# Patient Record
Sex: FEMALE | Race: WHITE | NOT HISPANIC OR LATINO
[De-identification: names, ages, dates, MRNs, and addresses within clinical notes are randomized per-mention and may not be internally consistent; named-entity substitution may affect disease eponyms.]

---

## 2017-02-28 ENCOUNTER — APPOINTMENT (OUTPATIENT)
Dept: HEART AND VASCULAR | Facility: CLINIC | Age: 72
End: 2017-02-28

## 2017-02-28 VITALS
BODY MASS INDEX: 28.34 KG/M2 | HEART RATE: 77 BPM | SYSTOLIC BLOOD PRESSURE: 134 MMHG | HEIGHT: 62 IN | WEIGHT: 154 LBS | DIASTOLIC BLOOD PRESSURE: 74 MMHG

## 2017-09-08 ENCOUNTER — APPOINTMENT (OUTPATIENT)
Dept: HEART AND VASCULAR | Facility: CLINIC | Age: 72
End: 2017-09-08
Payer: COMMERCIAL

## 2017-09-08 VITALS
BODY MASS INDEX: 28.34 KG/M2 | HEART RATE: 78 BPM | SYSTOLIC BLOOD PRESSURE: 128 MMHG | DIASTOLIC BLOOD PRESSURE: 67 MMHG | WEIGHT: 154 LBS | HEIGHT: 62 IN

## 2017-09-08 PROCEDURE — 99214 OFFICE O/P EST MOD 30 MIN: CPT | Mod: 25

## 2017-09-08 PROCEDURE — 93288 INTERROG EVL PM/LDLS PM IP: CPT

## 2018-03-27 ENCOUNTER — APPOINTMENT (OUTPATIENT)
Dept: HEART AND VASCULAR | Facility: CLINIC | Age: 73
End: 2018-03-27
Payer: COMMERCIAL

## 2018-03-27 VITALS
DIASTOLIC BLOOD PRESSURE: 63 MMHG | HEIGHT: 62 IN | SYSTOLIC BLOOD PRESSURE: 132 MMHG | BODY MASS INDEX: 28.71 KG/M2 | HEART RATE: 81 BPM | WEIGHT: 156 LBS

## 2018-03-27 PROCEDURE — 93280 PM DEVICE PROGR EVAL DUAL: CPT

## 2018-03-27 RX ORDER — RALOXIFENE HYDROCHLORIDE 60 MG/1
60 TABLET, FILM COATED ORAL DAILY
Refills: 0 | Status: DISCONTINUED | COMMUNITY
Start: 2017-09-08 | End: 2018-03-27

## 2018-09-28 ENCOUNTER — APPOINTMENT (OUTPATIENT)
Dept: HEART AND VASCULAR | Facility: CLINIC | Age: 73
End: 2018-09-28
Payer: COMMERCIAL

## 2018-09-28 VITALS
DIASTOLIC BLOOD PRESSURE: 72 MMHG | SYSTOLIC BLOOD PRESSURE: 142 MMHG | HEART RATE: 79 BPM | BODY MASS INDEX: 28.71 KG/M2 | WEIGHT: 156 LBS | HEIGHT: 62 IN

## 2018-09-28 PROCEDURE — 93280 PM DEVICE PROGR EVAL DUAL: CPT

## 2018-11-27 ENCOUNTER — APPOINTMENT (OUTPATIENT)
Dept: HEART AND VASCULAR | Facility: CLINIC | Age: 73
End: 2018-11-27
Payer: COMMERCIAL

## 2018-11-27 VITALS
SYSTOLIC BLOOD PRESSURE: 133 MMHG | HEART RATE: 84 BPM | HEIGHT: 62 IN | DIASTOLIC BLOOD PRESSURE: 66 MMHG | BODY MASS INDEX: 28.52 KG/M2 | WEIGHT: 155 LBS

## 2018-11-27 PROCEDURE — 93280 PM DEVICE PROGR EVAL DUAL: CPT

## 2018-11-27 NOTE — PROCEDURE
[No] : not [Complete Heart Block] : complete heart block [Pacemaker] : pacemaker [DDD] : DDD [Voltage: ___ volts] : Voltage was [unfilled] volts [Threshold Testing Performed] : Threshold testing was performed [Lead Imp:  ___ohms] : lead impedance was [unfilled] ohms [Sensing Amplitude ___mv] : sensing amplitude was [unfilled] mv [___V @] : [unfilled] V [___ ms] : [unfilled] ms [None] : none [Asense-Vpace ___ %] : Asense-Vpace [unfilled]% [Apace-Vpace ___ %] : Apace-Vpace [unfilled]% [de-identified] : escape ~ 50 bpm  [de-identified] : Medtronic [de-identified] : Revo MRI [de-identified] : [id number] [de-identified] : 11/1/12 [de-identified] :  [de-identified] : AP 5%\par  >99%    \par No A or V events

## 2018-11-27 NOTE — PHYSICAL EXAM
[General Appearance - Well Developed] : well developed [Normal Appearance] : normal appearance [Well Groomed] : well groomed [General Appearance - Well Nourished] : well nourished [No Deformities] : no deformities [General Appearance - In No Acute Distress] : no acute distress [Heart Rate And Rhythm] : heart rate and rhythm were normal [Heart Sounds] : normal S1 and S2 [Murmurs] : no murmurs present [Arterial Pulses Normal] : the arterial pulses were normal [Edema] : no peripheral edema present [Respiration, Rhythm And Depth] : normal respiratory rhythm and effort [Exaggerated Use Of Accessory Muscles For Inspiration] : no accessory muscle use [Auscultation Breath Sounds / Voice Sounds] : lungs were clear to auscultation bilaterally [Right Infraclavicular] : right infraclavicular area [Clean] : clean [Dry] : dry [Well-Healed] : well-healed [Palpable Crepitus] : no palpable crepitus [Bleeding] : no active bleeding [Foul Odor] : no foul smell [Purulent Drainage] : no purulent drainage [Serosanguineous Drainage] : no serosanquineous drainage [Serous Drainage] : no serous drainage [Erythema] : not erythematous [Warm] : not warm [Tender] : not tender [Indurated] : not indurated [Fluctuant] : not fluctuant [Abdomen Soft] : soft [Abdomen Tenderness] : non-tender [Abdomen Mass (___ Cm)] : no abdominal mass palpated [Nail Clubbing] : no clubbing of the fingernails [Cyanosis, Localized] : no localized cyanosis [Petechial Hemorrhages (___cm)] : no petechial hemorrhages [] : no ischemic changes

## 2018-11-27 NOTE — REVIEW OF SYSTEMS
[Fever] : no fever [Headache] : no headache [Recent Weight Gain (___ Lbs)] : no recent weight gain [Chills] : no chills [Feeling Fatigued] : not feeling fatigued [Recent Weight Loss (___ Lbs)] : no recent weight loss [Shortness Of Breath] : no shortness of breath [Dyspnea on exertion] : not dyspnea during exertion [Chest  Pressure] : no chest pressure [Chest Pain] : no chest pain [Lower Ext Edema] : no extremity edema [Leg Claudication] : no intermittent leg claudication [Palpitations] : no palpitations [Cough] : no cough [Wheezing] : no wheezing [Coughing Up Blood] : no hemoptysis [Dizziness] : no dizziness [Negative] : Heme/Lymph

## 2018-11-27 NOTE — HISTORY OF PRESENT ILLNESS
[Palpitations] : no palpitations [SOB] : no dyspnea [Syncope] : no syncope [Dizziness] : no dizziness [Chest Pain] : no chest pain or discomfort [ICD Shocks] : no recent ICD shocks [Shoulder Pain] : no shoulder pain [Pain at Site] : no pain at device site [Erythema at Site] : no erythema at device site [Swelling at Site] : no swelling at device site [de-identified] : Ms. Woodall is a 73 year old female with a pmhx of breast CA who is S/P PPM implant for complete AV block.  She continues to feel well and is without complaints.  No SOB, chest discomfort, palpitations, dizziness, syncope, PPM site complaints.  \par \par She presents today requesting clearance for upcoming cystoscopy or possible resection of bladder cancer.

## 2019-06-04 ENCOUNTER — APPOINTMENT (OUTPATIENT)
Dept: HEART AND VASCULAR | Facility: CLINIC | Age: 74
End: 2019-06-04
Payer: COMMERCIAL

## 2019-06-04 VITALS — HEART RATE: 78 BPM | DIASTOLIC BLOOD PRESSURE: 78 MMHG | SYSTOLIC BLOOD PRESSURE: 140 MMHG

## 2019-06-04 PROCEDURE — 93280 PM DEVICE PROGR EVAL DUAL: CPT

## 2019-06-04 NOTE — HISTORY OF PRESENT ILLNESS
[Palpitations] : no palpitations [SOB] : no dyspnea [Syncope] : no syncope [Dizziness] : no dizziness [Chest Pain] : no chest pain or discomfort [ICD Shocks] : no recent ICD shocks [Shoulder Pain] : no shoulder pain [Pain at Site] : no pain at device site [Erythema at Site] : no erythema at device site [Swelling at Site] : no swelling at device site [de-identified] : Ms. Woodall is a 73 year old female with a pmhx of breast CA who is S/P PPM implant for complete AV block.  She continues to feel well and is without complaints.  No SOB, chest discomfort, palpitations, dizziness, syncope, PPM site complaints.  \par \par Interval history significant for diagnosis of bladder CA s/p resection.  S/P 6 rounds of BCG.  Developed severe lower extremity pain- now on Cymbalta and undergoing PT.

## 2019-06-04 NOTE — PROCEDURE
[No] : not [Pacemaker] : pacemaker [Complete Heart Block] : complete heart block [DDD] : DDD [Voltage: ___ volts] : Voltage was [unfilled] volts [Threshold Testing Performed] : Threshold testing was performed [Lead Imp:  ___ohms] : lead impedance was [unfilled] ohms [Sensing Amplitude ___mv] : sensing amplitude was [unfilled] mv [___V @] : [unfilled] V [___ ms] : [unfilled] ms [None] : none [Asense-Vpace ___ %] : Asense-Vpace [unfilled]% [Apace-Vpace ___ %] : Apace-Vpace [unfilled]% [de-identified] : escape ~ 50 bpm  [de-identified] : Medtronic [de-identified] : Revo MRI [de-identified] : [id number] [de-identified] : 11/1/12 [de-identified] :  [de-identified] : AP 4%\par  >99%    \par 1 Brief episode NSVT 11/27/19

## 2019-06-04 NOTE — REVIEW OF SYSTEMS
[Negative] : Heme/Lymph [Fever] : no fever [Headache] : no headache [Recent Weight Gain (___ Lbs)] : no recent weight gain [Chills] : no chills [Feeling Fatigued] : not feeling fatigued [Recent Weight Loss (___ Lbs)] : no recent weight loss [Shortness Of Breath] : no shortness of breath [Dyspnea on exertion] : not dyspnea during exertion [Chest  Pressure] : no chest pressure [Chest Pain] : no chest pain [Lower Ext Edema] : no extremity edema [Leg Claudication] : no intermittent leg claudication [Palpitations] : no palpitations [Cough] : no cough [Wheezing] : no wheezing [Coughing Up Blood] : no hemoptysis [Dizziness] : no dizziness

## 2019-06-04 NOTE — PHYSICAL EXAM
[General Appearance - Well Developed] : well developed [Well Groomed] : well groomed [Normal Appearance] : normal appearance [General Appearance - Well Nourished] : well nourished [General Appearance - In No Acute Distress] : no acute distress [Heart Rate And Rhythm] : heart rate and rhythm were normal [No Deformities] : no deformities [Heart Sounds] : normal S1 and S2 [Murmurs] : no murmurs present [Arterial Pulses Normal] : the arterial pulses were normal [Edema] : no peripheral edema present [Exaggerated Use Of Accessory Muscles For Inspiration] : no accessory muscle use [Respiration, Rhythm And Depth] : normal respiratory rhythm and effort [Auscultation Breath Sounds / Voice Sounds] : lungs were clear to auscultation bilaterally [Right Infraclavicular] : right infraclavicular area [Well-Healed] : well-healed [Dry] : dry [Clean] : clean [Abdomen Soft] : soft [Abdomen Tenderness] : non-tender [Abdomen Mass (___ Cm)] : no abdominal mass palpated [Cyanosis, Localized] : no localized cyanosis [Petechial Hemorrhages (___cm)] : no petechial hemorrhages [Nail Clubbing] : no clubbing of the fingernails [] : no ischemic changes [Palpable Crepitus] : no palpable crepitus [Bleeding] : no active bleeding [Foul Odor] : no foul smell [Purulent Drainage] : no purulent drainage [Serosanguineous Drainage] : no serosanquineous drainage [Serous Drainage] : no serous drainage [Erythema] : not erythematous [Warm] : not warm [Tender] : not tender [Indurated] : not indurated [Fluctuant] : not fluctuant

## 2019-08-23 ENCOUNTER — APPOINTMENT (OUTPATIENT)
Dept: HEART AND VASCULAR | Facility: CLINIC | Age: 74
End: 2019-08-23
Payer: COMMERCIAL

## 2019-08-23 VITALS
DIASTOLIC BLOOD PRESSURE: 67 MMHG | WEIGHT: 160 LBS | SYSTOLIC BLOOD PRESSURE: 128 MMHG | HEART RATE: 80 BPM | HEIGHT: 62 IN | BODY MASS INDEX: 29.44 KG/M2

## 2019-08-23 PROCEDURE — 99213 OFFICE O/P EST LOW 20 MIN: CPT | Mod: 25

## 2019-08-23 PROCEDURE — 93280 PM DEVICE PROGR EVAL DUAL: CPT

## 2019-08-23 NOTE — HISTORY OF PRESENT ILLNESS
[Palpitations] : no palpitations [SOB] : no dyspnea [Syncope] : no syncope [Dizziness] : no dizziness [Chest Pain] : no chest pain or discomfort [ICD Shocks] : no recent ICD shocks [Shoulder Pain] : no shoulder pain [Pain at Site] : no pain at device site [Erythema at Site] : no erythema at device site [Swelling at Site] : no swelling at device site [de-identified] : Ms. Woodall is a 73 year old female with a PMHx of breast CA who is S/P PPM implant for complete /high degree AV block.  She continues to feel well and is without complaints.  No SOB, chest discomfort, palpitations, dizziness, syncope, PPM site complaints.  \par \par Interval history significant for diagnosis of bladder CA s/p resection.  S/P 6 rounds of BCG. She reports that the bladder cancer has returned and she will need another surgery.\par \par She has no pacemaker related complaints.

## 2019-08-23 NOTE — PHYSICAL EXAM
[General Appearance - Well Developed] : well developed [Normal Appearance] : normal appearance [Well Groomed] : well groomed [General Appearance - Well Nourished] : well nourished [No Deformities] : no deformities [General Appearance - In No Acute Distress] : no acute distress [Heart Rate And Rhythm] : heart rate and rhythm were normal [Heart Sounds] : normal S1 and S2 [Murmurs] : no murmurs present [Arterial Pulses Normal] : the arterial pulses were normal [Edema] : no peripheral edema present [Respiration, Rhythm And Depth] : normal respiratory rhythm and effort [Exaggerated Use Of Accessory Muscles For Inspiration] : no accessory muscle use [Auscultation Breath Sounds / Voice Sounds] : lungs were clear to auscultation bilaterally [Right Infraclavicular] : right infraclavicular area [Clean] : clean [Dry] : dry [Well-Healed] : well-healed [Abdomen Soft] : soft [Abdomen Tenderness] : non-tender [Abdomen Mass (___ Cm)] : no abdominal mass palpated [Cyanosis, Localized] : no localized cyanosis [Nail Clubbing] : no clubbing of the fingernails [Petechial Hemorrhages (___cm)] : no petechial hemorrhages [] : no ischemic changes [Palpable Crepitus] : no palpable crepitus [Bleeding] : no active bleeding [Foul Odor] : no foul smell [Purulent Drainage] : no purulent drainage [Serosanguineous Drainage] : no serosanquineous drainage [Serous Drainage] : no serous drainage [Erythema] : not erythematous [Warm] : not warm [Fluctuant] : not fluctuant [Tender] : not tender [Indurated] : not indurated

## 2019-08-23 NOTE — REVIEW OF SYSTEMS
[Negative] : Heme/Lymph [Headache] : no headache [Fever] : no fever [Recent Weight Gain (___ Lbs)] : no recent weight gain [Feeling Fatigued] : not feeling fatigued [Chills] : no chills [Recent Weight Loss (___ Lbs)] : no recent weight loss [Shortness Of Breath] : no shortness of breath [Dyspnea on exertion] : not dyspnea during exertion [Chest  Pressure] : no chest pressure [Chest Pain] : no chest pain [Lower Ext Edema] : no extremity edema [Leg Claudication] : no intermittent leg claudication [Palpitations] : no palpitations [Cough] : no cough [Wheezing] : no wheezing [Dizziness] : no dizziness [Coughing Up Blood] : no hemoptysis

## 2020-08-14 ENCOUNTER — APPOINTMENT (OUTPATIENT)
Dept: HEART AND VASCULAR | Facility: CLINIC | Age: 75
End: 2020-08-14
Payer: COMMERCIAL

## 2020-08-14 VITALS
OXYGEN SATURATION: 99 % | RESPIRATION RATE: 14 BRPM | WEIGHT: 160 LBS | SYSTOLIC BLOOD PRESSURE: 120 MMHG | HEART RATE: 80 BPM | HEIGHT: 62 IN | BODY MASS INDEX: 29.44 KG/M2 | TEMPERATURE: 98.3 F | DIASTOLIC BLOOD PRESSURE: 82 MMHG

## 2020-08-14 PROCEDURE — 93280 PM DEVICE PROGR EVAL DUAL: CPT

## 2020-08-14 PROCEDURE — 99212 OFFICE O/P EST SF 10 MIN: CPT | Mod: 25

## 2020-08-14 RX ORDER — DULOXETINE HYDROCHLORIDE 30 MG/1
CAPSULE, DELAYED RELEASE ORAL
Refills: 0 | Status: DISCONTINUED | COMMUNITY
End: 2020-08-14

## 2020-08-14 NOTE — HISTORY OF PRESENT ILLNESS
"[facility] Treatment      Name: Yanna Kwong  Clinic Number: [id number]  Date of Treatment: 2018   Diagnosis:   No diagnosis found.  Physician: Shirin Krishna, *    Pain pattern determined: 1 PEN  Plan of care signed: 2017   Time in: 7:40 (Pt 10 mins late)  Time Out:8:40  Total Treatment time: 65 min  Precautions: Standard  Visit #: 18    POC due: 18 not signed yet, but sent 18  Reassessment done: 18  Next reassessment due: 18      Pt has completed previous PT with [facility] and is now charged as commercial insurance.     Face to Face discussion of patient was done between PT and PTA.       Subjective   Yanna reports feeling ok today with no pain, although she still gets LBP at the end of the day. The pain was centralized to the low back and R buttock.    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, light duty, sitting, occasionally going up and down stairs)    Leisure: Bowl, walking for exercise, Does "[employer]"                      Pts goals:  Reduce pain, learn how to manage your symptom     Objective     Baseline IM Testing Results:   Date of testin2017  ROM 48-0 deg   Max Peak Torque 82    Min Peak Torque 34    Flex/Ext Ratio 2.4   % below normative data -69      Date of testin2017  ROM 60-0 deg   Max Peak Torque 125   Min Peak Torque 56   Flex/Ext Ratio 2.3   % below normative data --46      OUTCOMES:   From Initial Evaluation  FOTO: Focus on Therapeutic Outcomes   Category: lumbar   % Impaired: FOTO Not completed at time of eval  Current Score  = CJ = at least 20% but < 40% impaired, limited or restricted  Goal at Discharge Score = CJ = at least 20% but < 40% impaired, limited or restricted    Treatment    Pt was instructed in and performed the following:     Yanna received therapeutic exercises to " develop/improved posture, cardiovascular endurance, muscular endurance, lumbar ROM, strength and muscular endurance for 50 minutes including the following exercises:         [facility] 2/16/2018   Visit Number 18   VAS Pain Rating 2   Treadmill Time (in min.) -   Speed -   Extension in Lying -   Extension in Standing 10   Flexion in Lying 10   Manual Therapy -   Lumbar Flexion -   Lumbar Extension -   Lumbar Peak Torque -   Min Torque -   Lumbar Weight 69   Repetitions 18   Rating of Perceived Exertion 3   Ice - Z Lie (in min.) 10       Sidelying clamshells 15x cuing to watch pelvic and engage core  Flexion in lying 10x,   Supine pelvic tilts 10   Modified Piriformis stretch 10 cuing for positioning hip  Bridges 10x  Sidelying bridge 10x   Calf raises off step or on wedge 15-20 reps, 3-4x weekly      Peripheral muscle strengthening which included 1 set of 15-20 repetitions at a slow, controlled 7 second per rep pace focused on strengthening supporting musculature for improved body mechanics and functional mobility.  Pt and therapist focused on proper form during treatment to ensure optimal strengthening of each targeted muscle group.  Machines were utilized including torso rotation, leg extension, leg curl, chest press, upright row. Tricep extension, bicep curl, ( omitted today leg press, and hip abduction.)    Manual therapy: None      Home Exercise Program as follows:   Sidelying clamshells 15x, 3-4x weekly   Flexion in lying 10x, 3x/day  Supine pelvic tilts 5-10 s/h 5x, 2x daily   Z-lie 10-20 min, 2x daily  Modified Piriformis stretch 10s/h 3x/day  Bridges 10x, 3x/day  Standing extension 10x, 3x/day  Figure four stretches  Sidelying bridge 10x   Calf raises off step or on wedge 15-20 reps, 3-4x weekly     Tennis ball trigger point release on wall and supine hold for 15 secs.   Handouts were given to the patient. Pt demo good understanding of the education provided. Yanna demonstrated good return  demonstration of activities.     Lumbar roll use compliance: no  Additional exercises taught this treatment session:  HEP Review   Assessment     Patient reported mild low back pain which improved throughout treatment session. Pt performs HEP with mild-moderate v/c for technique. Educated her to stand and do EIS when having pain with sitting. Pt was able to complete 18 reps  on lumbar med  With no c/o LBP. Pt performed all resistance exercises without increased symptoms.   Pt is making fair progress with treatment.    Pt will continue to benefit from skilled outpatient physical therapy to address the deficits stated in the impairment chart, provide pt/family education and to maximize pt's level of independence in the home and community environment.       Pt's spiritual, cultural and educational needs considered and pt agreeable to plan of care and goals as stated below:     Medical necessity is demonstrated by the following IMPAIRMENTS/PROBLEMS:    Pt presents with the following impairments:   History  Co-morbidities and personal factors that may impact the plan of care   Examination  Body Structures and Functions, activity limitations and participation restrictions that may impact the plan of care Clinical Presentation Decision Making/ Complexity Score   Co-morbidities:   Standard           Personal Factors:   lifestyle  character   Body Regions:   back  lower extremities     Body Systems:   gross symmetry  ROM  strength  gross coordinated movement  transitions     Activity limitations:   Learning and applying knowledge  no deficits     General Tasks and Commands  no deficits     Communication  no deficits     Mobility  lifting and carrying objects  walking  driving (bike, car, motorcycle)  Standing     Self care  washing oneself (bathing, drying, washing hands)  caring for body parts (brushing teeth, shaving, grooming)  dressing     Domestic Life  doing house work (cleaning house, washing dishes,  laundry)     Interactions/Relationships  no deficits     Life Areas  employment     Community and Social Life  community life  recreation and leisure     Participation Restrictions:   Increased pain with prolonged sitting needed for work. Also limited ability to perform exercise.     stable and uncomplicated    Low      Goals:   Short term goals:  6 weeks or 10 visits   1.  Pt will demonstrate increased lumbar ROM by at least 3 degrees from the initial ROM value with improvements noted in functional ROM and ability to perform ADLs.MET  2.  Pt will demonstrate increased maximum isometric torque value by 10% when compared to the initial value resulting in improved ability to perform bending, lifting, and carrying activities safely, confidently.  (MET)  3.  Patient report a reduction in worst pain score by 1-2 points for improved tolerance during work and recreational activities.  (progressing, not met)  4.  Pt able to perform HEP correctly with minimal cueing or supervision for therapist.  (MET)     Long term goals: 13 weeks or 20 visits   1. Pt will demonstrate increased lumbar ROM by at least 6 degrees from initial ROM value, resulting in improved ability to perform functional fwd bending while standing and sitting.  (progressing, not met)  2. Pt will demonstrate increased maximum isometric torque value by 20% when compared to the initial value resulting in improved ability to perform bending, lifting, and carrying activities safely, confidently.  (progressing, not met)  3. Pt to demonstrate ability to independently control and reduce their pain through posture positioning and mechanical movements throughout a typical day.  (progressing, not met)  4.  Patient will demonstrate improved overall function per FOTO Survey to CJ = at least 20% but < 40% impaired, limited or restricted score or less.  (progressing, not met)         Plan   Continue with POC   [SOB] : no dyspnea [Syncope] : no syncope [Palpitations] : no palpitations [Chest Pain] : no chest pain or discomfort [ICD Shocks] : no recent ICD shocks [Dizziness] : no dizziness [Shoulder Pain] : no shoulder pain [Erythema at Site] : no erythema at device site [Pain at Site] : no pain at device site [Swelling at Site] : no swelling at device site [de-identified] : Ms. Woodall is a 74 year old female with a PMHx of breast CA who is S/P PPM implant for complete /high degree AV block who presents for routine f/u and EP clearance for hip surgery. She continues to feel well and is without complaints.  No SOB, chest discomfort, palpitations, dizziness, syncope, PPM site complaints.  \par \par Interval history significant for diagnosis of bladder CA s/p resection.  S/P 6 rounds of BCG. She reports that the bladder cancer has returned and she will need another surgery.\par \par She has no pacemaker related complaints.

## 2020-08-14 NOTE — PHYSICAL EXAM
[Well Groomed] : well groomed [General Appearance - Well Developed] : well developed [Normal Appearance] : normal appearance [General Appearance - Well Nourished] : well nourished [General Appearance - In No Acute Distress] : no acute distress [No Deformities] : no deformities [Heart Rate And Rhythm] : heart rate and rhythm were normal [Heart Sounds] : normal S1 and S2 [Arterial Pulses Normal] : the arterial pulses were normal [Murmurs] : no murmurs present [Edema] : no peripheral edema present [Exaggerated Use Of Accessory Muscles For Inspiration] : no accessory muscle use [Auscultation Breath Sounds / Voice Sounds] : lungs were clear to auscultation bilaterally [Respiration, Rhythm And Depth] : normal respiratory rhythm and effort [Right Infraclavicular] : right infraclavicular area [Clean] : clean [Dry] : dry [Well-Healed] : well-healed [Abdomen Soft] : soft [Abdomen Tenderness] : non-tender [Nail Clubbing] : no clubbing of the fingernails [Abdomen Mass (___ Cm)] : no abdominal mass palpated [] : no ischemic changes [Cyanosis, Localized] : no localized cyanosis [Petechial Hemorrhages (___cm)] : no petechial hemorrhages [Foul Odor] : no foul smell [Palpable Crepitus] : no palpable crepitus [Bleeding] : no active bleeding [Serous Drainage] : no serous drainage [Serosanguineous Drainage] : no serosanquineous drainage [Purulent Drainage] : no purulent drainage [Erythema] : not erythematous [Warm] : not warm [Tender] : not tender [Indurated] : not indurated [Fluctuant] : not fluctuant

## 2020-08-14 NOTE — PROCEDURE
[No] : not [Complete Heart Block] : complete heart block [Voltage: ___ volts] : Voltage was [unfilled] volts [Pacemaker] : pacemaker [DDD] : DDD [Threshold Testing Performed] : Threshold testing was performed [Lead Imp:  ___ohms] : lead impedance was [unfilled] ohms [Sensing Amplitude ___mv] : sensing amplitude was [unfilled] mv [___V @] : [unfilled] V [___ ms] : [unfilled] ms [None] : none [Asense-Vpace ___ %] : Asense-Vpace [unfilled]% [Apace-Vpace ___ %] : Apace-Vpace [unfilled]% [de-identified] : escape ~ 50 bpm  [de-identified] : Medtronic [de-identified] : Revo MRI [de-identified] : [id number] [de-identified] : 11/1/12 [de-identified] :  [de-identified] : AP 5%\par  >99%    \par No AT/AF\par No NSVT/VT

## 2020-08-14 NOTE — REVIEW OF SYSTEMS
[Negative] : Heme/Lymph [Recent Weight Gain (___ Lbs)] : no recent weight gain [Fever] : no fever [Headache] : no headache [Chills] : no chills [Feeling Fatigued] : not feeling fatigued [Recent Weight Loss (___ Lbs)] : no recent weight loss [Dyspnea on exertion] : not dyspnea during exertion [Shortness Of Breath] : no shortness of breath [Chest Pain] : no chest pain [Lower Ext Edema] : no extremity edema [Chest  Pressure] : no chest pressure [Leg Claudication] : no intermittent leg claudication [Palpitations] : no palpitations [Cough] : no cough [Wheezing] : no wheezing [Coughing Up Blood] : no hemoptysis [Dizziness] : no dizziness

## 2020-08-14 NOTE — DISCUSSION/SUMMARY
[Patient] : the patient [Pacemaker Function Normal] : normal pacemaker function [FreeTextEntry1] : Patient presents for routine device check and EPS clearance for R hip sugery. The device is functioning appropriately. She is NOT dependent. There are no contraindications to the planned surgery. Please call [phone] with any questions.

## 2021-03-09 ENCOUNTER — APPOINTMENT (OUTPATIENT)
Dept: HEART AND VASCULAR | Facility: CLINIC | Age: 76
End: 2021-03-09

## 2021-07-09 ENCOUNTER — PREPPED CHART (OUTPATIENT)
Dept: URBAN - METROPOLITAN AREA CLINIC 92 | Facility: CLINIC | Age: 76
End: 2021-07-09

## 2021-07-09 PROBLEM — H43.813 POSTERIOR VITREOUS DETACHMENT: Noted: 2021-07-09

## 2021-07-09 PROBLEM — H25.13 NS CATARACT: Noted: 2021-07-09

## 2021-07-09 PROBLEM — H25.11 NS CATARACT: Noted: 2021-07-09

## 2021-08-30 ENCOUNTER — APPOINTMENT (OUTPATIENT)
Dept: HEART AND VASCULAR | Facility: CLINIC | Age: 76
End: 2021-08-30
Payer: COMMERCIAL

## 2021-08-30 ENCOUNTER — NON-APPOINTMENT (OUTPATIENT)
Age: 76
End: 2021-08-30

## 2021-08-30 PROCEDURE — 93294 REM INTERROG EVL PM/LDLS PM: CPT

## 2021-08-30 PROCEDURE — 93296 REM INTERROG EVL PM/IDS: CPT

## 2021-09-17 ENCOUNTER — APPOINTMENT (OUTPATIENT)
Dept: HEART AND VASCULAR | Facility: CLINIC | Age: 76
End: 2021-09-17

## 2021-09-21 ENCOUNTER — FORM ENCOUNTER (OUTPATIENT)
Age: 76
End: 2021-09-21

## 2021-11-30 ENCOUNTER — NON-APPOINTMENT (OUTPATIENT)
Age: 76
End: 2021-11-30

## 2021-11-30 ENCOUNTER — APPOINTMENT (OUTPATIENT)
Dept: HEART AND VASCULAR | Facility: CLINIC | Age: 76
End: 2021-11-30
Payer: COMMERCIAL

## 2021-11-30 PROCEDURE — 93296 REM INTERROG EVL PM/IDS: CPT

## 2021-11-30 PROCEDURE — 93294 REM INTERROG EVL PM/LDLS PM: CPT | Mod: NC

## 2021-12-08 ENCOUNTER — FORM ENCOUNTER (OUTPATIENT)
Age: 76
End: 2021-12-08

## 2022-03-01 ENCOUNTER — NON-APPOINTMENT (OUTPATIENT)
Age: 77
End: 2022-03-01

## 2022-03-01 ENCOUNTER — APPOINTMENT (OUTPATIENT)
Dept: HEART AND VASCULAR | Facility: CLINIC | Age: 77
End: 2022-03-01
Payer: COMMERCIAL

## 2022-03-01 PROCEDURE — 93296 REM INTERROG EVL PM/IDS: CPT

## 2022-03-01 PROCEDURE — 93294 REM INTERROG EVL PM/LDLS PM: CPT

## 2022-03-02 ENCOUNTER — FORM ENCOUNTER (OUTPATIENT)
Age: 77
End: 2022-03-02

## 2022-05-31 ENCOUNTER — APPOINTMENT (OUTPATIENT)
Dept: HEART AND VASCULAR | Facility: CLINIC | Age: 77
End: 2022-05-31

## 2022-06-02 ENCOUNTER — FORM ENCOUNTER (OUTPATIENT)
Age: 77
End: 2022-06-02

## 2022-06-03 ENCOUNTER — APPOINTMENT (OUTPATIENT)
Dept: HEART AND VASCULAR | Facility: CLINIC | Age: 77
End: 2022-06-03
Payer: COMMERCIAL

## 2022-06-03 VITALS
SYSTOLIC BLOOD PRESSURE: 180 MMHG | BODY MASS INDEX: 29.08 KG/M2 | HEIGHT: 62 IN | WEIGHT: 158 LBS | DIASTOLIC BLOOD PRESSURE: 110 MMHG | HEART RATE: 93 BPM | TEMPERATURE: 95.8 F | OXYGEN SATURATION: 98 %

## 2022-06-03 PROCEDURE — 93280 PM DEVICE PROGR EVAL DUAL: CPT

## 2022-06-03 NOTE — PHYSICAL EXAM
[General Appearance - Well Developed] : well developed [Normal Appearance] : normal appearance [Well Groomed] : well groomed [General Appearance - Well Nourished] : well nourished [No Deformities] : no deformities [General Appearance - In No Acute Distress] : no acute distress [Heart Rate And Rhythm] : heart rate and rhythm were normal [Heart Sounds] : normal S1 and S2 [Murmurs] : no murmurs present [Arterial Pulses Normal] : the arterial pulses were normal [Edema] : no peripheral edema present [Respiration, Rhythm And Depth] : normal respiratory rhythm and effort [Exaggerated Use Of Accessory Muscles For Inspiration] : no accessory muscle use [Auscultation Breath Sounds / Voice Sounds] : lungs were clear to auscultation bilaterally [Right Infraclavicular] : right infraclavicular area [Clean] : clean [Dry] : dry [Well-Healed] : well-healed [Abdomen Soft] : soft [Abdomen Tenderness] : non-tender [Abdomen Mass (___ Cm)] : no abdominal mass palpated [Nail Clubbing] : no clubbing of the fingernails [Cyanosis, Localized] : no localized cyanosis [Petechial Hemorrhages (___cm)] : no petechial hemorrhages [] : no ischemic changes [Palpable Crepitus] : no palpable crepitus [Bleeding] : no active bleeding [Foul Odor] : no foul smell [Purulent Drainage] : no purulent drainage [Serosanguineous Drainage] : no serosanquineous drainage [Serous Drainage] : no serous drainage [Erythema] : not erythematous [Warm] : not warm [Tender] : not tender [Indurated] : not indurated [Fluctuant] : not fluctuant

## 2022-06-03 NOTE — HISTORY OF PRESENT ILLNESS
[Palpitations] : no palpitations [SOB] : no dyspnea [Syncope] : no syncope [Dizziness] : no dizziness [Chest Pain] : no chest pain or discomfort [ICD Shocks] : no recent ICD shocks [Shoulder Pain] : no shoulder pain [Pain at Site] : no pain at device site [Erythema at Site] : no erythema at device site [Swelling at Site] : no swelling at device site [de-identified] : Ms. 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 She continues to feel well and is without complaints.  No SOB, chest discomfort, palpitations, dizziness, syncope, PPM site complaints.  \par Interval history significant for diagnosis of bladder CA s/p resection.  S/P 6 rounds of BCG. She reports that the bladder cancer has returned and she will need another surgery.\par \par She has no pacemaker related complaints.

## 2022-06-03 NOTE — PROCEDURE
[No] : not [Complete Heart Block] : complete heart block [Pacemaker] : pacemaker [DDD] : DDD [Voltage: ___ volts] : Voltage was [unfilled] volts [Threshold Testing Performed] : Threshold testing was performed [Lead Imp:  ___ohms] : lead impedance was [unfilled] ohms [Sensing Amplitude ___mv] : sensing amplitude was [unfilled] mv [___V @] : [unfilled] V [___ ms] : [unfilled] ms [None] : none [Asense-Vpace ___ %] : Asense-Vpace [unfilled]% [Apace-Vpace ___ %] : Apace-Vpace [unfilled]% [de-identified] : escape ~ 50 bpm  [de-identified] : Medtronic [de-identified] : [employer] [de-identified] : [id number] [de-identified] : 11/1/12 [de-identified] :  [de-identified] : AP 6.2%\par  >99%    \par No AT/AF\par No NSVT/VT

## 2022-07-11 ENCOUNTER — ESTABLISHED COMPREHENSIVE EXAM (OUTPATIENT)
Dept: URBAN - METROPOLITAN AREA CLINIC 92 | Facility: CLINIC | Age: 77
End: 2022-07-11

## 2022-07-11 DIAGNOSIS — H25.13: ICD-10-CM

## 2022-07-11 DIAGNOSIS — H43.813: ICD-10-CM

## 2022-07-11 PROCEDURE — 92250 FUNDUS PHOTOGRAPHY W/I&R: CPT

## 2022-07-11 PROCEDURE — 92014 COMPRE OPH EXAM EST PT 1/>: CPT

## 2022-07-11 PROCEDURE — 92015 DETERMINE REFRACTIVE STATE: CPT

## 2022-07-11 ASSESSMENT — KERATOMETRY
OS_K1POWER_DIOPTERS: 44.75
OS_K2POWER_DIOPTERS: 45.00
OD_K2POWER_DIOPTERS: 45.50
OD_AXISANGLE_DEGREES: 153
OD_AXISANGLE2_DEGREES: 63
OD_K1POWER_DIOPTERS: 44.75
OS_AXISANGLE2_DEGREES: 138
OS_AXISANGLE_DEGREES: 48

## 2022-07-11 ASSESSMENT — TONOMETRY
OD_IOP_MMHG: 15
OS_IOP_MMHG: 12

## 2022-08-09 ENCOUNTER — NON-APPOINTMENT (OUTPATIENT)
Age: 77
End: 2022-08-09

## 2022-08-09 ENCOUNTER — APPOINTMENT (OUTPATIENT)
Dept: HEART AND VASCULAR | Facility: CLINIC | Age: 77
End: 2022-08-09

## 2022-08-09 PROCEDURE — 93296 REM INTERROG EVL PM/IDS: CPT

## 2022-08-09 PROCEDURE — 93294 REM INTERROG EVL PM/LDLS PM: CPT

## 2022-08-14 ENCOUNTER — FORM ENCOUNTER (OUTPATIENT)
Age: 77
End: 2022-08-14

## 2022-08-15 ENCOUNTER — OUTPATIENT (OUTPATIENT)
Dept: OUTPATIENT SERVICES | Facility: HOSPITAL | Age: 77
LOS: 1 days | Discharge: HOME | End: 2022-08-15
Payer: COMMERCIAL

## 2022-08-15 ENCOUNTER — TRANSCRIPTION ENCOUNTER (OUTPATIENT)
Age: 77
End: 2022-08-15

## 2022-08-15 LAB
ISTAT INR: 1.1 — SIGNIFICANT CHANGE UP (ref 0.88–1.16)
ISTAT PT: 13 SEC — HIGH (ref 10–12.9)
ISTAT VENOUS BE: 0 MMOL/L — SIGNIFICANT CHANGE UP (ref -2–3)
ISTAT VENOUS GLUCOSE: 109 MG/DL — HIGH (ref 70–99)
ISTAT VENOUS HCO3: 25 MMOL/L — SIGNIFICANT CHANGE UP (ref 23–28)
ISTAT VENOUS HEMATOCRIT: 43 % — SIGNIFICANT CHANGE UP (ref 34.5–45)
ISTAT VENOUS HEMOGLOBIN: 14.6 GM/DL — SIGNIFICANT CHANGE UP (ref 11.5–15.5)
ISTAT VENOUS IONIZED CALCIUM: 1.29 MMOL/L — SIGNIFICANT CHANGE UP (ref 1.12–1.3)
ISTAT VENOUS PCO2: 42 MMHG — SIGNIFICANT CHANGE UP (ref 41–51)
ISTAT VENOUS PH: 7.38 — SIGNIFICANT CHANGE UP (ref 7.31–7.41)
ISTAT VENOUS PO2: <66 MMHG — LOW (ref 35–40)
ISTAT VENOUS POTASSIUM: 3.6 MMOL/L — SIGNIFICANT CHANGE UP (ref 3.5–5.3)
ISTAT VENOUS SO2: 61 % — SIGNIFICANT CHANGE UP
ISTAT VENOUS SODIUM: 141 MMOL/L — SIGNIFICANT CHANGE UP (ref 135–145)
ISTAT VENOUS TCO2: 26 MMOL/L — SIGNIFICANT CHANGE UP (ref 22–31)
POCT ISTAT CREATININE: 1 MG/DL — SIGNIFICANT CHANGE UP (ref 0.5–1.3)

## 2022-08-15 PROCEDURE — 85610 PROTHROMBIN TIME: CPT

## 2022-08-15 PROCEDURE — C1889: CPT

## 2022-08-15 PROCEDURE — C1785: CPT

## 2022-08-15 PROCEDURE — 84295 ASSAY OF SERUM SODIUM: CPT

## 2022-08-15 PROCEDURE — 82330 ASSAY OF CALCIUM: CPT

## 2022-08-15 PROCEDURE — 82947 ASSAY GLUCOSE BLOOD QUANT: CPT

## 2022-08-15 PROCEDURE — 93286 PERI-PX EVAL PM/LDLS PM IP: CPT | Mod: 59

## 2022-08-15 PROCEDURE — 85014 HEMATOCRIT: CPT

## 2022-08-15 PROCEDURE — 33228 REMV&REPLC PM GEN DUAL LEAD: CPT

## 2022-08-15 PROCEDURE — 93286 PERI-PX EVAL PM/LDLS PM IP: CPT | Mod: 26,59

## 2022-08-15 PROCEDURE — 82565 ASSAY OF CREATININE: CPT

## 2022-08-15 PROCEDURE — 84132 ASSAY OF SERUM POTASSIUM: CPT

## 2022-08-15 PROCEDURE — 82803 BLOOD GASES ANY COMBINATION: CPT

## 2022-09-16 ENCOUNTER — APPOINTMENT (OUTPATIENT)
Dept: HEART AND VASCULAR | Facility: CLINIC | Age: 77
End: 2022-09-16

## 2022-09-16 VITALS
HEART RATE: 105 BPM | SYSTOLIC BLOOD PRESSURE: 173 MMHG | DIASTOLIC BLOOD PRESSURE: 84 MMHG | HEIGHT: 62 IN | BODY MASS INDEX: 29.08 KG/M2 | WEIGHT: 158 LBS

## 2022-09-16 PROCEDURE — 93280 PM DEVICE PROGR EVAL DUAL: CPT

## 2022-10-06 NOTE — PROCEDURE
[No] : not [Complete Heart Block] : complete heart block [Pacemaker] : pacemaker [DDD] : DDD [Threshold Testing Performed] : Threshold testing was performed [Lead Imp:  ___ohms] : lead impedance was [unfilled] ohms [Sensing Amplitude ___mv] : sensing amplitude was [unfilled] mv [___V @] : [unfilled] V [___ ms] : [unfilled] ms [None] : none [Asense-Vpace ___ %] : Asense-Vpace [unfilled]% [Apace-Vpace ___ %] : Apace-Vpace [unfilled]% [de-identified] : escape ~ 50 bpm  [de-identified] : Medtronic [de-identified] : Rev[employer] [de-identified] : [id number] [de-identified] : 11/1/12 [de-identified] :  [de-identified] : 13.4 years [de-identified] : AP 7.3%\par  >99%    \par No AT/AF\par No NSVT/VT

## 2022-10-06 NOTE — HISTORY OF PRESENT ILLNESS
[Palpitations] : no palpitations [SOB] : no dyspnea [Syncope] : no syncope [Dizziness] : no dizziness [Chest Pain] : no chest pain or discomfort [ICD Shocks] : no recent ICD shocks [Shoulder Pain] : no shoulder pain [Pain at Site] : no pain at device site [Erythema at Site] : no erythema at device site [Swelling at Site] : no swelling at device site [de-identified] : Ms. 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 \par \par She continues to feel well. Incision healing well but she complains that her bra strap keeps catching and is concerned about cosmetic appearance. No SOB, chest discomfort, palpitations, dizziness, syncope, PPM site complaints.  \par \par Interval history significant for diagnosis of bladder CA s/p resection.  S/P 6 rounds of BCG. She reports that the bladder cancer has returned and she will need another surgery.\par \par She has no pacemaker related complaints.

## 2022-10-24 ENCOUNTER — CATARACT CONSULTATION (OUTPATIENT)
Dept: URBAN - METROPOLITAN AREA CLINIC 92 | Facility: CLINIC | Age: 77
End: 2022-10-24

## 2022-10-24 DIAGNOSIS — H25.13: ICD-10-CM

## 2022-10-24 PROCEDURE — 99214 OFFICE O/P EST MOD 30 MIN: CPT

## 2022-10-24 ASSESSMENT — KERATOMETRY
OS_K1POWER_DIOPTERS: 44.75
OD_K1POWER_DIOPTERS: 44.75
OS_AXISANGLE2_DEGREES: 138
OS_K2POWER_DIOPTERS: 45.00
OS_AXISANGLE_DEGREES: 48
OD_K2POWER_DIOPTERS: 45.50
OD_AXISANGLE_DEGREES: 153
OD_AXISANGLE2_DEGREES: 63

## 2022-10-24 ASSESSMENT — VISUAL ACUITY
OS_CC: 20/50
OD_CC: 20/40

## 2022-11-08 ENCOUNTER — APPOINTMENT (OUTPATIENT)
Dept: HEART AND VASCULAR | Facility: CLINIC | Age: 77
End: 2022-11-08

## 2022-11-08 ENCOUNTER — NON-APPOINTMENT (OUTPATIENT)
Age: 77
End: 2022-11-08

## 2022-11-08 PROCEDURE — 93296 REM INTERROG EVL PM/IDS: CPT

## 2022-11-08 PROCEDURE — 93294 REM INTERROG EVL PM/LDLS PM: CPT

## 2022-11-22 ASSESSMENT — KERATOMETRY
OD_AXISANGLE_DEGREES: 153
OS_AXISANGLE2_DEGREES: 138
OD_K2POWER_DIOPTERS: 45.50
OS_K1POWER_DIOPTERS: 44.75
OS_K2POWER_DIOPTERS: 45.00
OD_AXISANGLE2_DEGREES: 63
OS_AXISANGLE_DEGREES: 48
OD_K1POWER_DIOPTERS: 44.75

## 2022-11-28 ENCOUNTER — PRE-OP CATARACT MEASUREMENTS (OUTPATIENT)
Dept: URBAN - METROPOLITAN AREA CLINIC 92 | Facility: CLINIC | Age: 77
End: 2022-11-28

## 2022-11-28 DIAGNOSIS — H25.13: ICD-10-CM

## 2022-11-28 PROCEDURE — 92136 OPHTHALMIC BIOMETRY: CPT

## 2022-12-01 ASSESSMENT — KERATOMETRY
OD_K2POWER_DIOPTERS: 45.50
OD_AXISANGLE_DEGREES: 153
OS_K2POWER_DIOPTERS: 45.00
OD_K1POWER_DIOPTERS: 44.75
OS_AXISANGLE_DEGREES: 48
OS_AXISANGLE2_DEGREES: 138
OD_AXISANGLE2_DEGREES: 63
OS_K1POWER_DIOPTERS: 44.75

## 2022-12-07 ENCOUNTER — SURGERY/PROCEDURE (OUTPATIENT)
Dept: URBAN - METROPOLITAN AREA SURGICAL CENTER 4 | Facility: SURGICAL CENTER | Age: 77
End: 2022-12-07

## 2022-12-07 DIAGNOSIS — H25.13: ICD-10-CM

## 2022-12-07 PROCEDURE — 66984 XCAPSL CTRC RMVL W/O ECP: CPT

## 2022-12-08 ENCOUNTER — 1 DAY POST-OP (OUTPATIENT)
Dept: URBAN - METROPOLITAN AREA CLINIC 92 | Facility: CLINIC | Age: 77
End: 2022-12-08

## 2022-12-08 DIAGNOSIS — Z98.42: ICD-10-CM

## 2022-12-08 PROCEDURE — 99024 POSTOP FOLLOW-UP VISIT: CPT

## 2022-12-08 ASSESSMENT — KERATOMETRY
OD_K2POWER_DIOPTERS: 45.50
OS_K1POWER_DIOPTERS: 44.75
OD_AXISANGLE2_DEGREES: 63
OD_K1POWER_DIOPTERS: 44.75
OS_AXISANGLE_DEGREES: 48
OS_K2POWER_DIOPTERS: 45.00
OS_AXISANGLE2_DEGREES: 138
OD_AXISANGLE_DEGREES: 153

## 2022-12-08 ASSESSMENT — TONOMETRY: OS_IOP_MMHG: 15

## 2022-12-08 ASSESSMENT — VISUAL ACUITY: OS_SC: 20/100

## 2022-12-15 ENCOUNTER — 1 WEEK POST-OP (OUTPATIENT)
Dept: URBAN - METROPOLITAN AREA CLINIC 92 | Facility: CLINIC | Age: 77
End: 2022-12-15

## 2022-12-15 DIAGNOSIS — Z98.42: ICD-10-CM

## 2022-12-15 DIAGNOSIS — H25.11: ICD-10-CM

## 2022-12-15 PROCEDURE — 99024 POSTOP FOLLOW-UP VISIT: CPT

## 2022-12-15 PROCEDURE — 92136 OPHTHALMIC BIOMETRY: CPT

## 2022-12-15 ASSESSMENT — KERATOMETRY
OD_K1POWER_DIOPTERS: 44.75
OD_AXISANGLE2_DEGREES: 63
OD_K2POWER_DIOPTERS: 45.50
OS_K2POWER_DIOPTERS: 45.00
OS_AXISANGLE2_DEGREES: 138
OS_K1POWER_DIOPTERS: 44.50
OS_K2POWER_DIOPTERS: 45.25
OS_AXISANGLE_DEGREES: 51
OD_AXISANGLE_DEGREES: 153
OS_AXISANGLE_DEGREES: 48
OS_AXISANGLE2_DEGREES: 141
OS_K1POWER_DIOPTERS: 44.75

## 2022-12-15 ASSESSMENT — TONOMETRY: OS_IOP_MMHG: 11

## 2022-12-15 ASSESSMENT — VISUAL ACUITY: OS_SC: 20/50

## 2022-12-16 ASSESSMENT — KERATOMETRY
OD_K2POWER_DIOPTERS: 45.50
OS_AXISANGLE2_DEGREES: 141
OS_AXISANGLE_DEGREES: 51
OS_AXISANGLE2_DEGREES: 138
OD_AXISANGLE2_DEGREES: 63
OS_K1POWER_DIOPTERS: 44.50
OD_AXISANGLE_DEGREES: 153
OS_K2POWER_DIOPTERS: 45.00
OS_K1POWER_DIOPTERS: 44.75
OS_AXISANGLE_DEGREES: 48
OD_K1POWER_DIOPTERS: 44.75
OS_K2POWER_DIOPTERS: 45.25

## 2022-12-21 ENCOUNTER — SURGERY/PROCEDURE (OUTPATIENT)
Dept: URBAN - METROPOLITAN AREA SURGICAL CENTER 4 | Facility: SURGICAL CENTER | Age: 77
End: 2022-12-21

## 2022-12-21 DIAGNOSIS — H25.11: ICD-10-CM

## 2022-12-21 PROCEDURE — 66984 XCAPSL CTRC RMVL W/O ECP: CPT | Mod: 79,RT

## 2022-12-22 ENCOUNTER — 1 DAY POST-OP (OUTPATIENT)
Dept: URBAN - METROPOLITAN AREA CLINIC 92 | Facility: CLINIC | Age: 77
End: 2022-12-22

## 2022-12-22 DIAGNOSIS — Z98.41: ICD-10-CM

## 2022-12-22 PROCEDURE — 99024 POSTOP FOLLOW-UP VISIT: CPT

## 2022-12-22 ASSESSMENT — KERATOMETRY
OS_AXISANGLE_DEGREES: 51
OS_AXISANGLE2_DEGREES: 141
OD_AXISANGLE_DEGREES: 153
OD_AXISANGLE2_DEGREES: 63
OS_K2POWER_DIOPTERS: 45.25
OD_K1POWER_DIOPTERS: 44.75
OS_AXISANGLE2_DEGREES: 138
OS_K1POWER_DIOPTERS: 44.75
OS_AXISANGLE_DEGREES: 48
OD_K2POWER_DIOPTERS: 45.50
OS_K2POWER_DIOPTERS: 45.00
OS_K1POWER_DIOPTERS: 44.50

## 2022-12-22 ASSESSMENT — VISUAL ACUITY
OD_SC: 20/50
OS_SC: 20/50

## 2022-12-22 ASSESSMENT — TONOMETRY: OD_IOP_MMHG: 14

## 2023-01-19 ENCOUNTER — 1 MONTH POST-OP (OUTPATIENT)
Dept: URBAN - METROPOLITAN AREA CLINIC 92 | Facility: CLINIC | Age: 78
End: 2023-01-19

## 2023-01-19 DIAGNOSIS — Z98.41: ICD-10-CM

## 2023-01-19 DIAGNOSIS — Z98.42: ICD-10-CM

## 2023-01-19 PROCEDURE — 99024 POSTOP FOLLOW-UP VISIT: CPT

## 2023-01-19 ASSESSMENT — KERATOMETRY
OS_AXISANGLE_DEGREES: 48
OD_AXISANGLE2_DEGREES: 63
OD_K1POWER_DIOPTERS: 44.75
OS_AXISANGLE2_DEGREES: 138
OS_AXISANGLE2_DEGREES: 135
OD_AXISANGLE_DEGREES: 136
OD_K2POWER_DIOPTERS: 44.75
OS_K1POWER_DIOPTERS: 44.75
OD_AXISANGLE_DEGREES: 153
OD_AXISANGLE2_DEGREES: 46
OS_K2POWER_DIOPTERS: 45.25
OS_K2POWER_DIOPTERS: 45.00
OS_AXISANGLE_DEGREES: 45
OS_AXISANGLE2_DEGREES: 141
OD_K1POWER_DIOPTERS: 43.75
OS_AXISANGLE_DEGREES: 51
OS_K1POWER_DIOPTERS: 44.50
OD_K2POWER_DIOPTERS: 45.50

## 2023-01-19 ASSESSMENT — VISUAL ACUITY
OD_SC: 20/30-1
OS_SC: 20/40

## 2023-01-19 ASSESSMENT — TONOMETRY
OS_IOP_MMHG: 12
OD_IOP_MMHG: 12

## 2023-02-07 ENCOUNTER — APPOINTMENT (OUTPATIENT)
Dept: HEART AND VASCULAR | Facility: CLINIC | Age: 78
End: 2023-02-07
Payer: COMMERCIAL

## 2023-02-07 ENCOUNTER — NON-APPOINTMENT (OUTPATIENT)
Age: 78
End: 2023-02-07

## 2023-02-07 PROCEDURE — 93296 REM INTERROG EVL PM/IDS: CPT

## 2023-02-07 PROCEDURE — 93294 REM INTERROG EVL PM/LDLS PM: CPT

## 2023-03-07 ENCOUNTER — APPOINTMENT (OUTPATIENT)
Dept: HEART AND VASCULAR | Facility: CLINIC | Age: 78
End: 2023-03-07

## 2023-04-18 ENCOUNTER — NON-APPOINTMENT (OUTPATIENT)
Age: 78
End: 2023-04-18

## 2023-04-18 ENCOUNTER — APPOINTMENT (OUTPATIENT)
Dept: HEART AND VASCULAR | Facility: CLINIC | Age: 78
End: 2023-04-18
Payer: COMMERCIAL

## 2023-04-18 VITALS
HEART RATE: 99 BPM | DIASTOLIC BLOOD PRESSURE: 85 MMHG | WEIGHT: 160 LBS | SYSTOLIC BLOOD PRESSURE: 151 MMHG | HEIGHT: 62 IN | BODY MASS INDEX: 29.44 KG/M2

## 2023-04-18 PROCEDURE — 93280 PM DEVICE PROGR EVAL DUAL: CPT

## 2023-04-18 NOTE — PROCEDURE
[No] : not [Complete Heart Block] : complete heart block [Pacemaker] : pacemaker [DDD] : DDD [Threshold Testing Performed] : Threshold testing was performed [Lead Imp:  ___ohms] : lead impedance was [unfilled] ohms [Sensing Amplitude ___mv] : sensing amplitude was [unfilled] mv [___V @] : [unfilled] V [___ ms] : [unfilled] ms [None] : none [Asense-Vpace ___ %] : Asense-Vpace [unfilled]% [Apace-Vpace ___ %] : Apace-Vpace [unfilled]% [de-identified] : escape ~ 50 bpm  [de-identified] : Medtronic [de-identified] : Rev[employer] MRI [de-identified] : [id number] [de-identified] : 11/1/12 [de-identified] :  [de-identified] : 13.4 years [de-identified] : AP 7.3%\par  >99%    \par No AT/AF\par No NSVT/VT

## 2023-04-18 NOTE — HISTORY OF PRESENT ILLNESS
[Palpitations] : no palpitations [SOB] : no dyspnea [Syncope] : no syncope [Dizziness] : no dizziness [Chest Pain] : no chest pain or discomfort [ICD Shocks] : no recent ICD shocks [Shoulder Pain] : no shoulder pain [Pain at Site] : no pain at device site [Erythema at Site] : no erythema at device site [Swelling at Site] : no swelling at device site [de-identified] : Ms. 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 \par \par She continues to feel well.  No SOB, chest discomfort, palpitations, dizziness, syncope, PPM site complaints.  \par \par History significant for diagnosis of bladder CA s/p resection.  S/P 6 rounds of BCG. \par \par SEE PACEART

## 2023-07-18 ENCOUNTER — APPOINTMENT (OUTPATIENT)
Dept: HEART AND VASCULAR | Facility: CLINIC | Age: 78
End: 2023-07-18
Payer: COMMERCIAL

## 2023-07-18 ENCOUNTER — NON-APPOINTMENT (OUTPATIENT)
Age: 78
End: 2023-07-18

## 2023-07-18 PROCEDURE — 93294 REM INTERROG EVL PM/LDLS PM: CPT

## 2023-07-18 PROCEDURE — 93296 REM INTERROG EVL PM/IDS: CPT

## 2023-10-09 NOTE — PROCEDURE
[No] : not [Complete Heart Block] : complete heart block [Pacemaker] : pacemaker [DDD] : DDD [Voltage: ___ volts] : Voltage was [unfilled] volts No (0) [Threshold Testing Performed] : Threshold testing was performed [Sensing Amplitude ___mv] : sensing amplitude was [unfilled] mv [Lead Imp:  ___ohms] : lead impedance was [unfilled] ohms [___V @] : [unfilled] V [___ ms] : [unfilled] ms [None] : none [Asense-Vpace ___ %] : Asense-Vpace [unfilled]% [Apace-Vpace ___ %] : Apace-Vpace [unfilled]% [de-identified] : escape ~ 50 bpm  [de-identified] : [employer] [de-identified] : [id number] [de-identified] : Medtronic [de-identified] : AP 4%\par  >99%    \par 1 Brief episode NSVT 11/27/19  [de-identified] :  [de-identified] : 11/1/12

## 2023-10-24 ENCOUNTER — NON-APPOINTMENT (OUTPATIENT)
Age: 78
End: 2023-10-24

## 2023-10-24 ENCOUNTER — APPOINTMENT (OUTPATIENT)
Dept: HEART AND VASCULAR | Facility: CLINIC | Age: 78
End: 2023-10-24
Payer: COMMERCIAL

## 2023-10-24 VITALS
SYSTOLIC BLOOD PRESSURE: 139 MMHG | HEART RATE: 82 BPM | WEIGHT: 160 LBS | DIASTOLIC BLOOD PRESSURE: 65 MMHG | HEIGHT: 62 IN | BODY MASS INDEX: 29.44 KG/M2

## 2023-10-24 PROCEDURE — 93280 PM DEVICE PROGR EVAL DUAL: CPT

## 2024-01-22 ENCOUNTER — NON-APPOINTMENT (OUTPATIENT)
Age: 79
End: 2024-01-22

## 2024-01-23 ENCOUNTER — APPOINTMENT (OUTPATIENT)
Dept: HEART AND VASCULAR | Facility: CLINIC | Age: 79
End: 2024-01-23
Payer: COMMERCIAL

## 2024-01-23 PROCEDURE — 93296 REM INTERROG EVL PM/IDS: CPT

## 2024-01-23 PROCEDURE — 93294 REM INTERROG EVL PM/LDLS PM: CPT

## 2024-04-30 ENCOUNTER — APPOINTMENT (OUTPATIENT)
Dept: HEART AND VASCULAR | Facility: CLINIC | Age: 79
End: 2024-04-30
Payer: COMMERCIAL

## 2024-04-30 ENCOUNTER — OUTPATIENT (OUTPATIENT)
Dept: OUTPATIENT SERVICES | Facility: HOSPITAL | Age: 79
LOS: 1 days | End: 2024-04-30
Payer: COMMERCIAL

## 2024-04-30 ENCOUNTER — RESULT REVIEW (OUTPATIENT)
Age: 79
End: 2024-04-30

## 2024-04-30 ENCOUNTER — NON-APPOINTMENT (OUTPATIENT)
Age: 79
End: 2024-04-30

## 2024-04-30 VITALS
HEART RATE: 76 BPM | HEIGHT: 62 IN | BODY MASS INDEX: 30.36 KG/M2 | SYSTOLIC BLOOD PRESSURE: 133 MMHG | WEIGHT: 165 LBS | OXYGEN SATURATION: 97 % | TEMPERATURE: 98.3 F | DIASTOLIC BLOOD PRESSURE: 77 MMHG

## 2024-04-30 DIAGNOSIS — I44.2 ATRIOVENTRICULAR BLOCK, COMPLETE: ICD-10-CM

## 2024-04-30 DIAGNOSIS — Z95.0 PRESENCE OF CARDIAC PACEMAKER: ICD-10-CM

## 2024-04-30 PROCEDURE — 93306 TTE W/DOPPLER COMPLETE: CPT | Mod: 26

## 2024-04-30 PROCEDURE — 93280 PM DEVICE PROGR EVAL DUAL: CPT

## 2024-04-30 PROCEDURE — 93306 TTE W/DOPPLER COMPLETE: CPT

## 2024-04-30 NOTE — ADDENDUM
[FreeTextEntry1] : I, Bess Thomas, am scribing for and the presence of Dr. Gonzalez the following sections: HPI, PMH,Family/social history, ROS, Physical Exam, Assessment / Plan.  I, Miguelito Gonzalez, personally performed the services described in the documentation, reviewed the documentation recorded by the scribe in my presence and it accurately and completely records my words and actions.

## 2024-04-30 NOTE — HISTORY OF PRESENT ILLNESS
[Palpitations] : no palpitations [SOB] : no dyspnea [Syncope] : no syncope [Dizziness] : no dizziness [Chest Pain] : no chest pain or discomfort [ICD Shocks] : no recent ICD shocks [Shoulder Pain] : no shoulder pain [Pain at Site] : no pain at device site [Erythema at Site] : no erythema at device site [Swelling at Site] : no swelling at device site [de-identified] : Ms. Woodall is a 78 year old female with a PMHx of breast CA,bladder CA s/p resection and BCG.   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   She continues to feel well.  No SOB, chest discomfort, palpitations, dizziness, syncope, PPM site complaints.    Echo done today -- results pending.  SEE PACEART

## 2024-07-09 ENCOUNTER — ESTABLISHED COMPREHENSIVE EXAM (OUTPATIENT)
Dept: URBAN - METROPOLITAN AREA CLINIC 92 | Facility: CLINIC | Age: 79
End: 2024-07-09

## 2024-07-09 DIAGNOSIS — Z96.1: ICD-10-CM

## 2024-07-09 DIAGNOSIS — H35.373: ICD-10-CM

## 2024-07-09 DIAGNOSIS — H16.223: ICD-10-CM

## 2024-07-09 PROCEDURE — 92014 COMPRE OPH EXAM EST PT 1/>: CPT

## 2024-07-09 PROCEDURE — 92250 FUNDUS PHOTOGRAPHY W/I&R: CPT

## 2024-07-09 ASSESSMENT — KERATOMETRY
OS_AXISANGLE_DEGREES: 39
OD_K1POWER_DIOPTERS: 44.00
OD_AXISANGLE_DEGREES: 107
OS_AXISANGLE2_DEGREES: 135
OD_AXISANGLE2_DEGREES: 63
OS_AXISANGLE2_DEGREES: 129
OS_K2POWER_DIOPTERS: 45.75
OD_K1POWER_DIOPTERS: 43.75
OS_AXISANGLE_DEGREES: 51
OS_AXISANGLE2_DEGREES: 141
OS_AXISANGLE_DEGREES: 48
OS_K1POWER_DIOPTERS: 44.75
OD_K2POWER_DIOPTERS: 45.50
OD_K1POWER_DIOPTERS: 44.75
OD_K2POWER_DIOPTERS: 45.00
OS_K1POWER_DIOPTERS: 44.50
OS_K1POWER_DIOPTERS: 45.25
OD_AXISANGLE2_DEGREES: 17
OS_AXISANGLE_DEGREES: 45
OD_AXISANGLE2_DEGREES: 46
OD_AXISANGLE_DEGREES: 153
OS_AXISANGLE2_DEGREES: 138
OD_K2POWER_DIOPTERS: 44.75
OS_K2POWER_DIOPTERS: 45.00
OD_AXISANGLE_DEGREES: 136
OS_K2POWER_DIOPTERS: 45.25

## 2024-07-09 ASSESSMENT — VISUAL ACUITY
OD_SC: 20/40
OS_SC: 20/100
OU_CC: 20/30

## 2024-07-09 ASSESSMENT — TONOMETRY
OD_IOP_MMHG: 12
OS_IOP_MMHG: 13

## 2024-07-30 ENCOUNTER — APPOINTMENT (OUTPATIENT)
Dept: HEART AND VASCULAR | Facility: CLINIC | Age: 79
End: 2024-07-30
Payer: COMMERCIAL

## 2024-07-30 ENCOUNTER — NON-APPOINTMENT (OUTPATIENT)
Age: 79
End: 2024-07-30

## 2024-07-30 PROCEDURE — 93294 REM INTERROG EVL PM/LDLS PM: CPT

## 2024-07-30 PROCEDURE — 93296 REM INTERROG EVL PM/IDS: CPT

## 2024-08-15 ASSESSMENT — KERATOMETRY
OD_K1POWER_DIOPTERS: 43.75
OS_K2POWER_DIOPTERS: 45.00
OD_AXISANGLE2_DEGREES: 63
OS_K1POWER_DIOPTERS: 44.75
OD_AXISANGLE_DEGREES: 153
OD_K2POWER_DIOPTERS: 44.75
OD_K1POWER_DIOPTERS: 44.00
OS_K2POWER_DIOPTERS: 45.75
OS_K1POWER_DIOPTERS: 45.25
OD_AXISANGLE_DEGREES: 136
OS_AXISANGLE_DEGREES: 39
OS_AXISANGLE_DEGREES: 51
OD_K1POWER_DIOPTERS: 44.75
OS_K1POWER_DIOPTERS: 44.50
OS_AXISANGLE2_DEGREES: 141
OD_K2POWER_DIOPTERS: 45.00
OS_AXISANGLE2_DEGREES: 129
OD_AXISANGLE2_DEGREES: 17
OS_K2POWER_DIOPTERS: 45.25
OD_AXISANGLE2_DEGREES: 46
OD_K2POWER_DIOPTERS: 45.50
OS_AXISANGLE2_DEGREES: 135
OD_AXISANGLE_DEGREES: 107
OS_AXISANGLE_DEGREES: 45
OS_AXISANGLE_DEGREES: 48
OS_AXISANGLE2_DEGREES: 138

## 2024-08-16 ENCOUNTER — FOLLOW UP (OUTPATIENT)
Dept: URBAN - METROPOLITAN AREA CLINIC 92 | Facility: CLINIC | Age: 79
End: 2024-08-16

## 2024-08-16 DIAGNOSIS — H35.373: ICD-10-CM

## 2024-08-16 DIAGNOSIS — H16.223: ICD-10-CM

## 2024-08-16 DIAGNOSIS — Z96.1: ICD-10-CM

## 2024-08-16 PROCEDURE — 92012 INTRM OPH EXAM EST PATIENT: CPT

## 2024-08-16 ASSESSMENT — VISUAL ACUITY
OD_SC: 20/40
OS_SC: 20/50

## 2024-08-16 ASSESSMENT — TONOMETRY
OD_IOP_MMHG: 11
OS_IOP_MMHG: 11

## 2024-10-29 ENCOUNTER — APPOINTMENT (OUTPATIENT)
Dept: HEART AND VASCULAR | Facility: CLINIC | Age: 79
End: 2024-10-29